# Patient Record
Sex: FEMALE | Race: WHITE | NOT HISPANIC OR LATINO | Employment: UNEMPLOYED | URBAN - METROPOLITAN AREA
[De-identification: names, ages, dates, MRNs, and addresses within clinical notes are randomized per-mention and may not be internally consistent; named-entity substitution may affect disease eponyms.]

---

## 2019-06-07 ENCOUNTER — TRANSCRIBE ORDERS (OUTPATIENT)
Dept: WOMENS IMAGING | Facility: HOSPITAL | Age: 29
End: 2019-06-07

## 2019-06-07 DIAGNOSIS — O26.91 PREGNANCY, COMPLICATIONS OF, FIRST TRIMESTER: ICD-10-CM

## 2019-06-07 DIAGNOSIS — O09.A1: Primary | ICD-10-CM

## 2019-06-11 ENCOUNTER — APPOINTMENT (OUTPATIENT)
Dept: WOMENS IMAGING | Facility: HOSPITAL | Age: 29
End: 2019-06-11

## 2019-06-26 ENCOUNTER — APPOINTMENT (OUTPATIENT)
Dept: WOMENS IMAGING | Facility: HOSPITAL | Age: 29
End: 2019-06-26

## 2019-11-06 ENCOUNTER — TRANSCRIBE ORDERS (OUTPATIENT)
Dept: WOMENS IMAGING | Facility: HOSPITAL | Age: 29
End: 2019-11-06

## 2019-11-06 DIAGNOSIS — B19.20 HEPATITIS C VIRUS INFECTION WITHOUT HEPATIC COMA, UNSPECIFIED CHRONICITY: ICD-10-CM

## 2019-11-06 DIAGNOSIS — F19.11 PERSONAL HISTORY OF DRUG ABUSE (HCC): ICD-10-CM

## 2019-11-06 DIAGNOSIS — O36.8390 ABNORMAL FETAL HEART RATE OR RHYTHM AFFECTING MANAGEMENT OF MOTHER: Primary | ICD-10-CM

## 2019-11-07 ENCOUNTER — HOSPITAL ENCOUNTER (OUTPATIENT)
Dept: WOMENS IMAGING | Facility: HOSPITAL | Age: 29
Discharge: HOME | End: 2019-11-07
Admitting: OBSTETRICS & GYNECOLOGY

## 2019-11-07 ENCOUNTER — OFFICE VISIT (OUTPATIENT)
Dept: OBSTETRICS AND GYNECOLOGY | Facility: HOSPITAL | Age: 29
End: 2019-11-07

## 2019-11-07 VITALS
HEIGHT: 67 IN | WEIGHT: 177 LBS | BODY MASS INDEX: 27.78 KG/M2 | SYSTOLIC BLOOD PRESSURE: 152 MMHG | DIASTOLIC BLOOD PRESSURE: 67 MMHG

## 2019-11-07 DIAGNOSIS — R03.0 ELEVATED BLOOD PRESSURE READING: ICD-10-CM

## 2019-11-07 DIAGNOSIS — B19.20 HEPATITIS C VIRUS INFECTION WITHOUT HEPATIC COMA, UNSPECIFIED CHRONICITY: ICD-10-CM

## 2019-11-07 DIAGNOSIS — O36.8390 ABNORMAL FETAL HEART RATE OR RHYTHM AFFECTING MANAGEMENT OF MOTHER: ICD-10-CM

## 2019-11-07 DIAGNOSIS — F11.20 OPIOID DEPENDENCE ON MAINTENANCE AGONIST THERAPY, NO SYMPTOMS (HCC): ICD-10-CM

## 2019-11-07 DIAGNOSIS — F19.11 PERSONAL HISTORY OF DRUG ABUSE (HCC): ICD-10-CM

## 2019-11-07 DIAGNOSIS — O34.219 PREVIOUS CESAREAN DELIVERY AFFECTING PREGNANCY: ICD-10-CM

## 2019-11-07 DIAGNOSIS — O36.8390 FETAL ARRHYTHMIA AFFECTING PREGNANCY, ANTEPARTUM: Primary | ICD-10-CM

## 2019-11-07 DIAGNOSIS — O36.8990 FETAL PERICARDIAL EFFUSION AFFECTING MANAGEMENT OF MOTHER: ICD-10-CM

## 2019-11-07 LAB
BILIRUB BLD-MCNC: NEGATIVE MG/DL
CLARITY, POC: CLEAR
COLOR UR: YELLOW
GLUCOSE UR STRIP-MCNC: ABNORMAL MG/DL
KETONES UR QL: NEGATIVE
LEUKOCYTE EST, POC: NEGATIVE
NITRITE UR-MCNC: NEGATIVE MG/ML
PH UR: 6 [PH] (ref 5–8)
PROT UR STRIP-MCNC: NEGATIVE MG/DL
RBC # UR STRIP: NEGATIVE /UL
SP GR UR: 1.01 (ref 1–1.03)
UROBILINOGEN UR QL: NORMAL

## 2019-11-07 PROCEDURE — 93325 DOPPLER ECHO COLOR FLOW MAPG: CPT | Performed by: OBSTETRICS & GYNECOLOGY

## 2019-11-07 PROCEDURE — 76825 ECHO EXAM OF FETAL HEART: CPT

## 2019-11-07 PROCEDURE — 76811 OB US DETAILED SNGL FETUS: CPT

## 2019-11-07 PROCEDURE — 93325 DOPPLER ECHO COLOR FLOW MAPG: CPT

## 2019-11-07 PROCEDURE — 76825 ECHO EXAM OF FETAL HEART: CPT | Performed by: OBSTETRICS & GYNECOLOGY

## 2019-11-07 PROCEDURE — 76811 OB US DETAILED SNGL FETUS: CPT | Performed by: OBSTETRICS & GYNECOLOGY

## 2019-11-07 PROCEDURE — 99241 PR OFFICE CONSULTATION NEW/ESTAB PATIENT 15 MIN: CPT | Performed by: OBSTETRICS & GYNECOLOGY

## 2019-11-07 PROCEDURE — 81002 URINALYSIS NONAUTO W/O SCOPE: CPT | Performed by: OBSTETRICS & GYNECOLOGY

## 2019-11-07 RX ORDER — PRENATAL WITH FERROUS FUM AND FOLIC ACID 3080; 920; 120; 400; 22; 1.84; 3; 20; 10; 1; 12; 200; 27; 25; 2 [IU]/1; [IU]/1; MG/1; [IU]/1; MG/1; MG/1; MG/1; MG/1; MG/1; MG/1; UG/1; MG/1; MG/1; MG/1; MG/1
TABLET ORAL DAILY
COMMUNITY

## 2019-11-07 RX ORDER — BUPRENORPHINE HYDROCHLORIDE 8 MG/1
16 TABLET SUBLINGUAL DAILY
Refills: 0 | COMMUNITY
Start: 2019-10-24

## 2019-11-07 RX ORDER — PROMETHAZINE HYDROCHLORIDE 12.5 MG/1
12.5 TABLET ORAL EVERY 6 HOURS PRN
Refills: 0 | COMMUNITY
Start: 2019-09-27

## 2019-11-07 NOTE — PROGRESS NOTES
"Pt denies all s/s PTL, denies other problems.  Pt reports \"4-5 caffienated sodas per day and coffee sometimes.\"  Denies cocoa butter use.  Pt denies all s/s preeclampsia.  Next OB appt 11/21/19.  Declined genetic screening tests.  "

## 2019-12-04 ENCOUNTER — APPOINTMENT (OUTPATIENT)
Dept: WOMENS IMAGING | Facility: HOSPITAL | Age: 29
End: 2019-12-04

## 2023-12-31 ENCOUNTER — HOSPITAL ENCOUNTER (INPATIENT)
Facility: HOSPITAL | Age: 33
LOS: 3 days | Discharge: NURSING, REHAB OR LONG-TERM CARE | DRG: 885 | End: 2024-01-03
Attending: PSYCHIATRY & NEUROLOGY | Admitting: PSYCHIATRY & NEUROLOGY
Payer: COMMERCIAL

## 2023-12-31 ENCOUNTER — HOSPITAL ENCOUNTER (EMERGENCY)
Facility: HOSPITAL | Age: 33
Discharge: PSYCHIATRIC OR SUBSTANCE USE FACILITY | DRG: 885 | End: 2023-12-31
Attending: STUDENT IN AN ORGANIZED HEALTH CARE EDUCATION/TRAINING PROGRAM | Admitting: STUDENT IN AN ORGANIZED HEALTH CARE EDUCATION/TRAINING PROGRAM
Payer: COMMERCIAL

## 2023-12-31 VITALS
BODY MASS INDEX: 24.48 KG/M2 | TEMPERATURE: 97.7 F | WEIGHT: 156 LBS | OXYGEN SATURATION: 97 % | SYSTOLIC BLOOD PRESSURE: 120 MMHG | DIASTOLIC BLOOD PRESSURE: 75 MMHG | HEIGHT: 67 IN | HEART RATE: 85 BPM | RESPIRATION RATE: 16 BRPM

## 2023-12-31 DIAGNOSIS — F39 MOOD DISORDER: Primary | ICD-10-CM

## 2023-12-31 PROBLEM — F29 PSYCHOSIS: Status: ACTIVE | Noted: 2023-12-31

## 2023-12-31 LAB
ALBUMIN SERPL-MCNC: 4.6 G/DL (ref 3.5–5.2)
ALBUMIN/GLOB SERPL: 1.4 G/DL
ALP SERPL-CCNC: 76 U/L (ref 39–117)
ALT SERPL W P-5'-P-CCNC: 20 U/L (ref 1–33)
AMPHET+METHAMPHET UR QL: NEGATIVE
AMPHETAMINES UR QL: NEGATIVE
ANION GAP SERPL CALCULATED.3IONS-SCNC: 15.6 MMOL/L (ref 5–15)
AST SERPL-CCNC: 15 U/L (ref 1–32)
B-HCG UR QL: NEGATIVE
BACTERIA UR QL AUTO: ABNORMAL /HPF
BARBITURATES UR QL SCN: NEGATIVE
BASOPHILS # BLD AUTO: 0.05 10*3/MM3 (ref 0–0.2)
BASOPHILS NFR BLD AUTO: 0.5 % (ref 0–1.5)
BENZODIAZ UR QL SCN: NEGATIVE
BILIRUB SERPL-MCNC: 0.3 MG/DL (ref 0–1.2)
BILIRUB UR QL STRIP: NEGATIVE
BUN SERPL-MCNC: 10 MG/DL (ref 6–20)
BUN/CREAT SERPL: 12 (ref 7–25)
BUPRENORPHINE SERPL-MCNC: NEGATIVE NG/ML
CALCIUM SPEC-SCNC: 10 MG/DL (ref 8.6–10.5)
CANNABINOIDS SERPL QL: NEGATIVE
CHLORIDE SERPL-SCNC: 103 MMOL/L (ref 98–107)
CLARITY UR: CLEAR
CO2 SERPL-SCNC: 22.4 MMOL/L (ref 22–29)
COCAINE UR QL: NEGATIVE
COLOR UR: YELLOW
CREAT SERPL-MCNC: 0.83 MG/DL (ref 0.57–1)
DEPRECATED RDW RBC AUTO: 46.5 FL (ref 37–54)
EGFRCR SERPLBLD CKD-EPI 2021: 94.4 ML/MIN/1.73
EOSINOPHIL # BLD AUTO: 0.04 10*3/MM3 (ref 0–0.4)
EOSINOPHIL NFR BLD AUTO: 0.4 % (ref 0.3–6.2)
ERYTHROCYTE [DISTWIDTH] IN BLOOD BY AUTOMATED COUNT: 16.3 % (ref 12.3–15.4)
ETHANOL BLD-MCNC: <10 MG/DL (ref 0–10)
ETHANOL UR QL: <0.01 %
FENTANYL UR-MCNC: NEGATIVE NG/ML
GLOBULIN UR ELPH-MCNC: 3.4 GM/DL
GLUCOSE SERPL-MCNC: 79 MG/DL (ref 65–99)
GLUCOSE UR STRIP-MCNC: NEGATIVE MG/DL
HCT VFR BLD AUTO: 38.7 % (ref 34–46.6)
HGB BLD-MCNC: 12 G/DL (ref 12–15.9)
HGB UR QL STRIP.AUTO: ABNORMAL
HOLD SPECIMEN: NORMAL
HOLD SPECIMEN: NORMAL
HYALINE CASTS UR QL AUTO: ABNORMAL /LPF
IMM GRANULOCYTES # BLD AUTO: 0.03 10*3/MM3 (ref 0–0.05)
IMM GRANULOCYTES NFR BLD AUTO: 0.3 % (ref 0–0.5)
KETONES UR QL STRIP: NEGATIVE
LEUKOCYTE ESTERASE UR QL STRIP.AUTO: ABNORMAL
LYMPHOCYTES # BLD AUTO: 2.67 10*3/MM3 (ref 0.7–3.1)
LYMPHOCYTES NFR BLD AUTO: 25 % (ref 19.6–45.3)
MAGNESIUM SERPL-MCNC: 2 MG/DL (ref 1.6–2.6)
MCH RBC QN AUTO: 24.4 PG (ref 26.6–33)
MCHC RBC AUTO-ENTMCNC: 31 G/DL (ref 31.5–35.7)
MCV RBC AUTO: 78.7 FL (ref 79–97)
METHADONE UR QL SCN: NEGATIVE
MONOCYTES # BLD AUTO: 0.68 10*3/MM3 (ref 0.1–0.9)
MONOCYTES NFR BLD AUTO: 6.4 % (ref 5–12)
NEUTROPHILS NFR BLD AUTO: 67.4 % (ref 42.7–76)
NEUTROPHILS NFR BLD AUTO: 7.23 10*3/MM3 (ref 1.7–7)
NITRITE UR QL STRIP: NEGATIVE
NRBC BLD AUTO-RTO: 0 /100 WBC (ref 0–0.2)
OPIATES UR QL: NEGATIVE
OXYCODONE UR QL SCN: NEGATIVE
PCP UR QL SCN: NEGATIVE
PH UR STRIP.AUTO: 6.5 [PH] (ref 5–8)
PLATELET # BLD AUTO: 230 10*3/MM3 (ref 140–450)
PMV BLD AUTO: 10.8 FL (ref 6–12)
POTASSIUM SERPL-SCNC: 3.9 MMOL/L (ref 3.5–5.2)
PROT SERPL-MCNC: 8 G/DL (ref 6–8.5)
PROT UR QL STRIP: NEGATIVE
RBC # BLD AUTO: 4.92 10*6/MM3 (ref 3.77–5.28)
RBC # UR STRIP: ABNORMAL /HPF
REF LAB TEST METHOD: ABNORMAL
SODIUM SERPL-SCNC: 141 MMOL/L (ref 136–145)
SP GR UR STRIP: 1.02 (ref 1–1.03)
SQUAMOUS #/AREA URNS HPF: ABNORMAL /HPF
TRICYCLICS UR QL SCN: NEGATIVE
UROBILINOGEN UR QL STRIP: ABNORMAL
WBC # UR STRIP: ABNORMAL /HPF
WBC NRBC COR # BLD AUTO: 10.7 10*3/MM3 (ref 3.4–10.8)
WHOLE BLOOD HOLD COAG: NORMAL
WHOLE BLOOD HOLD SPECIMEN: NORMAL

## 2023-12-31 PROCEDURE — 83735 ASSAY OF MAGNESIUM: CPT | Performed by: PHYSICIAN ASSISTANT

## 2023-12-31 PROCEDURE — 93010 ELECTROCARDIOGRAM REPORT: CPT | Performed by: INTERNAL MEDICINE

## 2023-12-31 PROCEDURE — 25010000002 LORAZEPAM PER 2 MG: Performed by: PSYCHIATRY & NEUROLOGY

## 2023-12-31 PROCEDURE — 80053 COMPREHEN METABOLIC PANEL: CPT | Performed by: PHYSICIAN ASSISTANT

## 2023-12-31 PROCEDURE — 36415 COLL VENOUS BLD VENIPUNCTURE: CPT

## 2023-12-31 PROCEDURE — 80307 DRUG TEST PRSMV CHEM ANLYZR: CPT | Performed by: PHYSICIAN ASSISTANT

## 2023-12-31 PROCEDURE — 81001 URINALYSIS AUTO W/SCOPE: CPT | Performed by: PHYSICIAN ASSISTANT

## 2023-12-31 PROCEDURE — 25010000002 HALOPERIDOL LACTATE PER 5 MG: Performed by: PSYCHIATRY & NEUROLOGY

## 2023-12-31 PROCEDURE — 85025 COMPLETE CBC W/AUTO DIFF WBC: CPT | Performed by: PHYSICIAN ASSISTANT

## 2023-12-31 PROCEDURE — 81025 URINE PREGNANCY TEST: CPT | Performed by: PHYSICIAN ASSISTANT

## 2023-12-31 PROCEDURE — 93005 ELECTROCARDIOGRAM TRACING: CPT | Performed by: PSYCHIATRY & NEUROLOGY

## 2023-12-31 PROCEDURE — 25010000002 DIPHENHYDRAMINE PER 50 MG: Performed by: PSYCHIATRY & NEUROLOGY

## 2023-12-31 PROCEDURE — 82077 ASSAY SPEC XCP UR&BREATH IA: CPT | Performed by: PHYSICIAN ASSISTANT

## 2023-12-31 PROCEDURE — 99285 EMERGENCY DEPT VISIT HI MDM: CPT

## 2023-12-31 PROCEDURE — 80074 ACUTE HEPATITIS PANEL: CPT | Performed by: PSYCHIATRY & NEUROLOGY

## 2023-12-31 RX ORDER — ACETAMINOPHEN 325 MG/1
650 TABLET ORAL EVERY 6 HOURS PRN
Status: DISCONTINUED | OUTPATIENT
Start: 2023-12-31 | End: 2024-01-03 | Stop reason: HOSPADM

## 2023-12-31 RX ORDER — BENZTROPINE MESYLATE 1 MG/ML
1 INJECTION INTRAMUSCULAR; INTRAVENOUS ONCE AS NEEDED
Status: DISCONTINUED | OUTPATIENT
Start: 2023-12-31 | End: 2024-01-03 | Stop reason: HOSPADM

## 2023-12-31 RX ORDER — LORAZEPAM 2 MG/ML
2 INJECTION INTRAMUSCULAR EVERY 6 HOURS PRN
Status: DISCONTINUED | OUTPATIENT
Start: 2023-12-31 | End: 2024-01-03 | Stop reason: HOSPADM

## 2023-12-31 RX ORDER — BENZTROPINE MESYLATE 1 MG/1
2 TABLET ORAL ONCE AS NEEDED
Status: DISCONTINUED | OUTPATIENT
Start: 2023-12-31 | End: 2024-01-03 | Stop reason: HOSPADM

## 2023-12-31 RX ORDER — DIPHENHYDRAMINE HYDROCHLORIDE 50 MG/ML
50 INJECTION INTRAMUSCULAR; INTRAVENOUS EVERY 6 HOURS PRN
Status: DISCONTINUED | OUTPATIENT
Start: 2023-12-31 | End: 2024-01-03 | Stop reason: HOSPADM

## 2023-12-31 RX ORDER — ONDANSETRON 4 MG/1
4 TABLET, FILM COATED ORAL EVERY 6 HOURS PRN
Status: DISCONTINUED | OUTPATIENT
Start: 2023-12-31 | End: 2024-01-03 | Stop reason: HOSPADM

## 2023-12-31 RX ORDER — IBUPROFEN 400 MG/1
400 TABLET ORAL EVERY 6 HOURS PRN
Status: DISCONTINUED | OUTPATIENT
Start: 2023-12-31 | End: 2024-01-03 | Stop reason: HOSPADM

## 2023-12-31 RX ORDER — ECHINACEA PURPUREA EXTRACT 125 MG
2 TABLET ORAL AS NEEDED
Status: DISCONTINUED | OUTPATIENT
Start: 2023-12-31 | End: 2024-01-03 | Stop reason: HOSPADM

## 2023-12-31 RX ORDER — FAMOTIDINE 20 MG/1
20 TABLET, FILM COATED ORAL 2 TIMES DAILY PRN
Status: DISCONTINUED | OUTPATIENT
Start: 2023-12-31 | End: 2024-01-03 | Stop reason: HOSPADM

## 2023-12-31 RX ORDER — BENZONATATE 100 MG/1
100 CAPSULE ORAL 3 TIMES DAILY PRN
Status: DISCONTINUED | OUTPATIENT
Start: 2023-12-31 | End: 2024-01-03 | Stop reason: HOSPADM

## 2023-12-31 RX ORDER — TRAZODONE HYDROCHLORIDE 50 MG/1
50 TABLET ORAL NIGHTLY PRN
Status: DISCONTINUED | OUTPATIENT
Start: 2023-12-31 | End: 2024-01-03 | Stop reason: HOSPADM

## 2023-12-31 RX ORDER — LOPERAMIDE HYDROCHLORIDE 2 MG/1
2 CAPSULE ORAL
Status: DISCONTINUED | OUTPATIENT
Start: 2023-12-31 | End: 2024-01-03 | Stop reason: HOSPADM

## 2023-12-31 RX ORDER — HYDROXYZINE 50 MG/1
50 TABLET, FILM COATED ORAL EVERY 6 HOURS PRN
Status: DISCONTINUED | OUTPATIENT
Start: 2023-12-31 | End: 2024-01-03 | Stop reason: HOSPADM

## 2023-12-31 RX ORDER — HALOPERIDOL 5 MG/ML
5 INJECTION INTRAMUSCULAR EVERY 6 HOURS PRN
Status: DISCONTINUED | OUTPATIENT
Start: 2023-12-31 | End: 2024-01-03 | Stop reason: HOSPADM

## 2023-12-31 RX ORDER — NICOTINE 21 MG/24HR
1 PATCH, TRANSDERMAL 24 HOURS TRANSDERMAL
Status: DISCONTINUED | OUTPATIENT
Start: 2023-12-31 | End: 2024-01-02

## 2023-12-31 RX ORDER — ALUMINA, MAGNESIA, AND SIMETHICONE 2400; 2400; 240 MG/30ML; MG/30ML; MG/30ML
15 SUSPENSION ORAL EVERY 6 HOURS PRN
Status: DISCONTINUED | OUTPATIENT
Start: 2023-12-31 | End: 2024-01-03 | Stop reason: HOSPADM

## 2023-12-31 RX ADMIN — LORAZEPAM 2 MG: 2 INJECTION INTRAMUSCULAR; INTRAVENOUS at 19:20

## 2023-12-31 RX ADMIN — HALOPERIDOL LACTATE 5 MG: 5 INJECTION, SOLUTION INTRAMUSCULAR at 19:20

## 2023-12-31 RX ADMIN — NICOTINE TRANSDERMAL SYSTEM 1 PATCH: 21 PATCH, EXTENDED RELEASE TRANSDERMAL at 16:12

## 2023-12-31 RX ADMIN — DIPHENHYDRAMINE HYDROCHLORIDE 50 MG: 50 INJECTION, SOLUTION INTRAMUSCULAR; INTRAVENOUS at 19:20

## 2023-12-31 NOTE — ED PROVIDER NOTES
"Subjective   History of Present Illness  33-year-old female who [facility]  When I asked her why she is here she states \"I think it is because my blood pressure was high.\"  She states she went to that facility last night.  I asked her what kind of drugs she had been using and she stated \"what ever the [facility] told me I had.\"  She states she does use \"ice\" and reports that she came off Suboxone on her own.  She states she occasionally drinks alcohol.  She denies any suicidal or homicidal ideations.  She denies any change in her appetite or sleep.  She denies any hallucinations.    History provided by:  Patient  Mental Health Problem  Presenting symptoms: bizarre behavior    Presenting symptoms: no suicidal thoughts    Degree of incapacity (severity):  Unable to specify  Onset quality:  Unable to specify  Timing:  Unable to specify  Progression:  Unable to specify  Context: drug abuse    Associated symptoms: no appetite change and no insomnia    Risk factors: hx of mental illness        Review of Systems   Constitutional: Negative.  Negative for appetite change.   HENT: Negative.     Eyes: Negative.    Respiratory: Negative.     Cardiovascular: Negative.    Gastrointestinal: Negative.    Genitourinary: Negative.    Musculoskeletal: Negative.    Skin: Negative.    Neurological: Negative.    Psychiatric/Behavioral:  Negative for sleep disturbance and suicidal ideas. The patient does not have insomnia.    All other systems reviewed and are negative.      Past Medical History:   Diagnosis Date    Bipolar affective     Depression     Hepatitis C     Kidney stones     Substance abuse        No Known Allergies    Past Surgical History:   Procedure Laterality Date     SECTION PRIMARY      CHOLECYSTECTOMY      CYSTOSCOPY W/ URETEROSCOPY W/ LITHOTRIPSY      SALPINGO OOPHORECTOMY Left        Family History   Family history unknown: Yes       Social History     Socioeconomic History    " Marital status:      Spouse name:     Number of children: 5    Years of education: 10th    Highest education level: 10th grade   Tobacco Use    Smoking status: Some Days     Packs/day: .5     Types: Cigarettes     Passive exposure: Current    Smokeless tobacco: Current    Tobacco comments:     Vape    Vaping Use    Vaping Use: Every day    Substances: Nicotine, Flavoring    Devices: Disposable, Pre-filled or refillable cartridge, Pre-filled pod   Substance and Sexual Activity    Alcohol use: Not Currently     Comment: not currently    Drug use: Not Currently     Comment: suboxone    Sexual activity: Defer     Birth control/protection: None           Objective   Physical Exam  Vitals and nursing note reviewed.   Constitutional:       General: She is not in acute distress.     Appearance: Normal appearance. She is not diaphoretic.   HENT:      Head: Normocephalic and atraumatic.      Right Ear: External ear normal.      Left Ear: External ear normal.      Nose: Nose normal.   Eyes:      Conjunctiva/sclera: Conjunctivae normal.      Pupils: Pupils are equal, round, and reactive to light.   Cardiovascular:      Rate and Rhythm: Normal rate and regular rhythm.      Pulses: Normal pulses.      Heart sounds: Normal heart sounds.   Pulmonary:      Effort: Pulmonary effort is normal.      Breath sounds: Normal breath sounds.   Abdominal:      General: Bowel sounds are normal.      Palpations: Abdomen is soft.   Musculoskeletal:         General: Normal range of motion.      Cervical back: Normal range of motion and neck supple.   Skin:     General: Skin is warm and dry.      Capillary Refill: Capillary refill takes less than 2 seconds.   Neurological:      General: No focal deficit present.      Mental Status: She is alert and oriented to person, place, and time.   Psychiatric:         Behavior: Behavior is cooperative.         Thought Content: Thought content does not include homicidal or suicidal ideation.       Comments: Laughing at inappropriate times         Procedures           ED Course  ED Course as of 12/31/23 1528   Sun Dec 31, 2023   1400 Endorsed to Dr. Alfaro []      ED Course User Index  [] Ida Santoyo PA                                             Medical Decision Making  Patient was cleared for behavioral health evaluation.    Patient will be admitted to behavioral health for further treatment and care.  Patient was agreeable to plan.  Vitals stable on admission to behavioral [facility].    Amount and/or Complexity of Data Reviewed  Labs: ordered.        Final diagnoses:   Mood disorder   Electronically signed by SILVINO Block, 12/31/23, 2:00 PM EST.     ED Disposition  ED Disposition       ED Disposition   DC/Transfer to Behavioral Health    Condition   Stable    Comment   --               No follow-up provider specified.       Medication List      No changes were made to your prescriptions during this visit.            Steven Alfaro,   12/31/23 1528

## 2023-12-31 NOTE — NURSING NOTE
"Pt assessment complete    [facility]/2 days. Pt states before this she was in retirement for 15 days for public intoxication.     Pt denies si/hi/avh   Pt does appear to be responding to internal stimuli. She is giggling to herself.   Depression 0  Anxiety 7  Pt reports coming here for \"high bp\"   Pt is oriented to self, and time, and was able to tell me who the president is.    Pt does have a response lag and is a poor historian but is able to answer most questions appropriately.     [facility] staff stated that in there evaluation today the pt had reported SI with no plan or intent.   Pt reported good sleep and appetite   Pt was unable to tell me if she had been using substances but reported it had been \"awhile\" since last drinking ETOH.  Pt does not appear to be in any withdrawal at this time.   UDS -     "

## 2023-12-31 NOTE — NURSING NOTE
Presented pt to DR. ANGELO Crocker new order to admit sp3 routine orders. Pt can be placed on a 72 hour hold if pt is not agreeable. RBOTX2

## 2023-12-31 NOTE — PLAN OF CARE
Goal Outcome Evaluation:     Patient Agreement with Plan of Care: refuses to participate             New admission.  Care plan initiated.

## 2024-01-01 PROBLEM — F19.90 POLYSUBSTANCE USE DISORDER: Status: ACTIVE | Noted: 2024-01-01

## 2024-01-01 PROBLEM — F31.9 BIPOLAR DISORDER, UNSPECIFIED: Status: ACTIVE | Noted: 2023-12-31

## 2024-01-01 PROBLEM — F43.10 PTSD (POST-TRAUMATIC STRESS DISORDER): Status: ACTIVE | Noted: 2024-01-01

## 2024-01-01 LAB
HAV IGM SERPL QL IA: ABNORMAL
HBV CORE IGM SERPL QL IA: ABNORMAL
HBV SURFACE AG SERPL QL IA: ABNORMAL
HCV AB SER DONR QL: REACTIVE
QT INTERVAL: 362 MS
QTC INTERVAL: 417 MS

## 2024-01-01 PROCEDURE — 99223 1ST HOSP IP/OBS HIGH 75: CPT | Performed by: PSYCHIATRY & NEUROLOGY

## 2024-01-01 RX ORDER — PRAZOSIN HYDROCHLORIDE 1 MG/1
1 CAPSULE ORAL NIGHTLY
Status: DISCONTINUED | OUTPATIENT
Start: 2024-01-01 | End: 2024-01-03 | Stop reason: HOSPADM

## 2024-01-01 RX ORDER — RISPERIDONE 0.25 MG/1
0.5 TABLET ORAL EVERY 12 HOURS SCHEDULED
Status: DISCONTINUED | OUTPATIENT
Start: 2024-01-01 | End: 2024-01-03 | Stop reason: HOSPADM

## 2024-01-01 RX ADMIN — NICOTINE TRANSDERMAL SYSTEM 1 PATCH: 21 PATCH, EXTENDED RELEASE TRANSDERMAL at 09:38

## 2024-01-01 RX ADMIN — HYDROXYZINE HYDROCHLORIDE 50 MG: 50 TABLET ORAL at 20:31

## 2024-01-01 RX ADMIN — RISPERIDONE 0.5 MG: 0.25 TABLET, FILM COATED ORAL at 12:52

## 2024-01-01 RX ADMIN — PRAZOSIN HYDROCHLORIDE 1 MG: 1 CAPSULE ORAL at 20:31

## 2024-01-01 NOTE — PLAN OF CARE
Goal Outcome Evaluation:  Plan of Care Reviewed With: patient  Patient Agreement with Plan of Care: refuses to participate     Progress: no change      Outcome Evaluation: Unable to assess patient related to behaviors and PRN medications. See notes
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
## 2024-01-01 NOTE — DISCHARGE INSTR - APPOINTMENTS
[facility] ([place], [place])  [address] [address] [address]    Return at discharge.

## 2024-01-01 NOTE — PROGRESS NOTES
[employer] by [facility]   Behavioral Health Service Request Form     Member Information    Line of Business: Medicaid  Date of Request: 24  State[place]  Member Name: AUSTEN 1990  Member Phone:[phone]  Service Type: Emergent Inpatient Admission     Referral/Service Type Requested     Request Type: Initial Request  Previous Auth#:   Inpatient Services: Inpatient Psychiatric   Outpatient Services:     Please Send Clinical Notes And Any Supporting Documentation     Primary ICD-10 Code for Treatment: Description: PSYCHOSIS     Dates of Service:2023  Procedure:  Service Codes:  Diagnosis Code:F 29  Requested Service: INPATIENT PSYH  Requested Units/Visits:5-7 days    Provider Information  Requesting Provider / Facility:     NPI#: [id number]  TIN#: [id number]  Phone: [phone]  Fax:[phone]    Address: [address] [place] [address]: [place]  State: [place]  Zip: [address]  PCP Name: Unknown  PCP Phone: unknown  Office Contact Name: Kathi Douglas RN  Office Contact Phone: [phone]    Servicing Provider / Facility:   Provider/Facility Name (Required):  DR NOHEMI LAWRENCE  NPI#: [id number]  TIN#: [id number]    Phone: [phone]  Fax: [phone]  Address: [address] [place] [address]: [place]  State: [place]  Zip: [address]      Patient presents to the nurses station yelling and demanding to leave. Attempted to redirect the patient multiple times without success. Redirected patient to room patient continues to yell hitting the walls.     Lead RN and security present for safety. Dr Shah notified.      New order for Haldol 5 mg IM q 6hrs prn Benadryl 50 mg Im q6hrs prn. Ativan 2 mg Im q6hrs for agitation ;  72 hr hold; Restraints if needed. RBVX2.            In attempt to enter PRN medication- epic will not allow to enter Ativan IM; Spoke with Pharmacy- Andrez Advised must enter a pharmacy message and will be entered by pharmacy related to ativan shortage.

## 2024-01-01 NOTE — NURSING NOTE
Debriefing completed at this time. Pt still cursing staff and demanding to leave. Unable to obtain vitals signs due to patient being verbally abusive with staff.

## 2024-01-01 NOTE — NURSING NOTE
"Resp. Tech present to complete EKG- Patient raised shirt for placement of sticker.  This nurse noted large bruise on left upper arm. When asking patient about bruise- pt repeats \" NO NO NO\"  Pt refused full skin assessment-  Educate patient at this time- importance of full head to assessment .  Pt continue to refused.         "

## 2024-01-01 NOTE — NURSING NOTE
Spoke to Dr. Shah received order for private room until assessed in the morning by oncoming MD. [id number].

## 2024-01-01 NOTE — NURSING NOTE
"ONE HR FACE TO FACE COMPLETE.  PT SITTING IN BED CONTINUES TO CURSE SECURITY AND STAFF DEMANDING TO LEAVE THIS \"FUCKING PLACE\".  PT REFUSED VITAL SIGNS, RESPIRATIONS EVEN AND UNLABORED, NO INJURY TO PT OR STAFF.  BRIEF HOLD DISCONTINUED.  DR GUTHRIE NOTIFIED BY HAMMAD ROGERS.  "

## 2024-01-02 VITALS
BODY MASS INDEX: 23.89 KG/M2 | RESPIRATION RATE: 18 BRPM | SYSTOLIC BLOOD PRESSURE: 132 MMHG | DIASTOLIC BLOOD PRESSURE: 72 MMHG | OXYGEN SATURATION: 100 % | TEMPERATURE: 96.8 F | WEIGHT: 152.2 LBS | HEART RATE: 109 BPM | HEIGHT: 67 IN

## 2024-01-02 LAB — TROPONIN T SERPL HS-MCNC: <6 NG/L

## 2024-01-02 PROCEDURE — 93005 ELECTROCARDIOGRAM TRACING: CPT | Performed by: PSYCHIATRY & NEUROLOGY

## 2024-01-02 PROCEDURE — 84484 ASSAY OF TROPONIN QUANT: CPT | Performed by: PSYCHIATRY & NEUROLOGY

## 2024-01-02 PROCEDURE — 93010 ELECTROCARDIOGRAM REPORT: CPT | Performed by: INTERNAL MEDICINE

## 2024-01-02 PROCEDURE — 99232 SBSQ HOSP IP/OBS MODERATE 35: CPT | Performed by: PSYCHIATRY & NEUROLOGY

## 2024-01-02 RX ADMIN — RISPERIDONE 0.5 MG: 0.25 TABLET, FILM COATED ORAL at 08:25

## 2024-01-02 RX ADMIN — NICOTINE POLACRILEX 2 MG: 2 GUM, CHEWING BUCCAL at 14:12

## 2024-01-02 RX ADMIN — NICOTINE POLACRILEX 2 MG: 2 GUM, CHEWING BUCCAL at 16:01

## 2024-01-02 RX ADMIN — HYDROXYZINE HYDROCHLORIDE 50 MG: 50 TABLET ORAL at 16:01

## 2024-01-02 RX ADMIN — RISPERIDONE 0.5 MG: 0.25 TABLET, FILM COATED ORAL at 21:17

## 2024-01-02 RX ADMIN — NICOTINE TRANSDERMAL SYSTEM 1 PATCH: 21 PATCH, EXTENDED RELEASE TRANSDERMAL at 08:25

## 2024-01-02 RX ADMIN — HYDROXYZINE HYDROCHLORIDE 50 MG: 50 TABLET ORAL at 08:25

## 2024-01-02 RX ADMIN — PRAZOSIN HYDROCHLORIDE 1 MG: 1 CAPSULE ORAL at 21:17

## 2024-01-02 NOTE — PLAN OF CARE
"Goal Outcome Evaluation:  Plan of Care Reviewed With: patient  Patient Agreement with Plan of Care: agrees     Progress: improving  Outcome Evaluation: Patient has been calm, cooperative and polite during this shift. She spends free time in the day room this evening watching TV and being interactive with peers. She apologized to staff for her behaviors last night and expressed to staff that she is greatful for the snacks that we provided her this evening. Patient laid down early this evening around 9pm . Patient reports sleep and appetite were good. She rates anxiety 10 and depression 5 and reports that she has been in a \"stable\" mood this evening. Patient denies any other complaints at this time.         "

## 2024-01-02 NOTE — PLAN OF CARE
Goal Outcome Evaluation:        Problem: Adult Behavioral Health Plan of Care  Goal: Patient-Specific Goal (Individualization)  Outcome: Ongoing, Progressing  Flowsheets  Taken 1/1/2024 1335 by Yvrose Joyce CSW  Patient-Specific Goals (Include Timeframe): Identify 2-3 coping skills, address relapse prevention methods, complete aftercare plans, and deny SI/HI prior to discharge.  Individualized Care Needs: Therapist to offer 1-4 therapy sessions, aftercare planning, safety planning, family education, group therapy, and breif CBT/MI interventions.  Taken 1/1/2024 1326 by Yvrose Joyce CSW  Patient Personal Strengths:   resourceful   resilient   parenting skills   spiritual/Anabaptist support   stable living environment   motivated for recovery   motivated for treatment   independent living skills  Patient Vulnerabilities:   occupational insecurity   poor impulse control   history of unsuccessful treatment   substance abuse/addiction   adverse childhood experience(s)   traumatic event  Taken 12/31/2023 1708 by Rajani Dougherty RN  Anxieties, Fears or Concerns: None verbalized  Goal: Optimized Coping Skills in Response to Life Stressors  Outcome: Ongoing, Progressing  Flowsheets (Taken 1/1/2024 1335)  Optimized Coping Skills in Response to Life Stressors: making progress toward outcome  Intervention: Promote Effective Coping Strategies  Flowsheets (Taken 1/2/2024 1124)  Supportive Measures:   active listening utilized   counseling provided   decision-making supported   goal-setting facilitated   positive reinforcement provided   verbalization of feelings encouraged   relaxation techniques promoted   self-care encouraged   self-reflection promoted   self-responsibility promoted  Goal: Develops/Participates in Therapeutic [place] to Support Successful Transition  Outcome: Ongoing, Progressing  Flowsheets (Taken 1/1/2024 1335)  Develops/Participates in Therapeutic [place] to Support Successful Transition:  making progress toward outcome  Intervention: Foster Therapeutic [place]  Flowsheets (Taken 1/2/2024 1124)  Trust Relationship/Rapport:   care explained   questions encouraged   choices provided   reassurance provided   emotional support provided   thoughts/feelings acknowledged   empathic listening provided   questions answered  Intervention: Mutually Develop Transition Plan  Flowsheets  Taken 1/2/2024 1123  Discharge Coordination/Progress: Patient has Passport Medicaid. Patient to return to [facility] at discharge, agency to transport.  Transportation Anticipated: agency  Transportation Concerns: none  Current Discharge Risk:   psychiatric illness   substance use/abuse   legal concerns  Concerns to be Addressed:   substance/tobacco abuse/use   cognitive/perceptual   coping/stress   medication   mental health   discharge planning  Readmission Within the Last 30 Days: no previous admission in last 30 days  Patient/Family Anticipated Services at Transition: rehabilitation services  Patient's Choice of Community Agency(s): [facility]  Patient/Family Anticipates Transition to: inpatient rehabilitation facility  Offered/Gave Vendor List: yes  Taken 1/1/2024 5825  Outpatient/Agency/Support Group Needs:   outpatient psychiatric care (specify)   outpatient medication management   outpatient substance abuse treatment (specify)   residential services  Transition Support:   follow-up care discussed   community resources reviewed   crisis management plan promoted   crisis management plan verbalized   follow-up care coordinated  Anticipated Discharge Disposition: (to be determined) other (see comments)         DATA:  Therapist met with Patient individually this date. Patient agreeable to discuss current treatment progress and discharge concerns.     CLINICAL MANUVERING/INTERVENTIONS:  Assisted Patient in processing session content; acknowledged and normalized Patient’s thoughts, feelings, and concerns by utilizing a person-centered approach  in efforts to build appropriate rapport and a positive therapeutic relationship with open and honest communication. Allowed Patient to ventilate regarding current stressors and triggers for negative emotions and thoughts in a safe nonjudgmental environment with unconditional positive regard, active listening skills, and empathy. Therapist implemented motivational interviewing techniques to assist Patient with exploring personal growth and change and discussed distress tolerance skills, self soothing techniques, and applied cognitive behavioral strategies to facilitate identification of maladaptive patterns of thinking and behavior.Therapist utilized dialectical behavior techniques to teach and model emotional regulation and relaxation methods. Therapist assisted Patient with identifying and implementing healthier coping strategies. Therapist assisted Patient with safety planning; Patient agreed to continue honest communication with Treatment Team while inpatient and identify any SI/HI.  Patient encouraged to seek nearest ER or contact 911 if danger to self or others post discharge.     ASSESSMENT:    Patient continues to receive treatment for bipolar disorder. Patient reports moderate anxiety and depression, denies current SI/HI/AVH. Patient denies experiencing any active withdrawal symptoms. Patient presents from [facility] and plans to return there when stable. Agency to provide transportation.    PLAN:   Patient will continue stabilization. Patient will continue to receive services offered by Treatment Team.     Patient plans to return to [facility], updated referral has been sent.
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
## 2024-01-02 NOTE — PROGRESS NOTES
"INPATIENT PSYCHIATRIC PROGRESS NOTE    Name:  Gordon Otero  :  1990  MRN:  [id number]  Visit Number:  [id number]  Length of stay:  2    SUBJECTIVE    CC/Focus of Exam: Bipolar disorder    INTERVAL HISTORY:  The patient reports she is feeling better. She states she was able to sleep good last night and her mood is good. Reports no thoughts of harm to self or others. Also reports no AVH.   Depression rating 5/10  Anxiety rating 5/10  Sleep: good  Withdrawal sx: None reported.   Cravin/10    Review of Systems   Constitutional: Negative.    Respiratory: Negative.     Cardiovascular: Negative.    Gastrointestinal: Negative.        OBJECTIVE    Temp:  [96.9 °F (36.1 °C)-97.8 °F (36.6 °C)] 97.2 °F (36.2 °C)  Heart Rate:  [86-96] 86  Resp:  [18] 18  BP: (110-161)/(56-74) 110/59    MENTAL STATUS EXAM:  Appearance:Casually dressed, good hygeine.   Cooperation:Cooperative  Psychomotor: No psychomotor agitation/retardation, No EPS, No motor tics  Speech-normal rate, amount.  Mood \"better\"   Affect-congruent, appropriate, stable  Thought Content-goal directed, no delusional material present  Thought process-linear, organized.  Suicidality: No SI  Homicidality: No HI  Perception: No AH/VH  Insight-fair   Judgement-fair    Lab Results (last 24 hours)       ** No results found for the last 24 hours. **               Imaging Results (Last 24 Hours)       ** No results found for the last 24 hours. **               ECG/EMG Results (most recent)       Procedure Component Value Units Date/Time    ECG 12 Lead Other; Baseline Cardiac Status [[id number]] Collected: 23 1711     Updated: 24 1848     QT Interval 362 ms      QTC Interval 417 ms     Narrative:      Test Reason : Other~  Blood Pressure :   */*   mmHG  Vent. Rate :  80 BPM     Atrial Rate :  80 BPM     P-R Int : 188 ms          QRS Dur :  76 ms      QT Int : 362 ms       P-R-T Axes :  70  60  61 degrees     QTc Int : 417 ms    Normal sinus rhythm  Possible " Left atrial enlargement  Borderline ECG  No previous ECGs available  Confirmed by Fifi De La Vega MD (2023) on 1/1/2024 6:47:13 PM    Referred By: HOWARD           Confirmed By: Fifi De La Vega MD             ALLERGIES: Patient has no known allergies.    Medication Review:   Scheduled Medications:  nicotine, 1 patch, Transdermal, Q24H  prazosin, 1 mg, Oral, Nightly  risperiDONE, 0.5 mg, Oral, Q12H         PRN Medications:    acetaminophen    aluminum-magnesium hydroxide-simethicone    benzonatate    benztropine **OR** benztropine    haloperidol lactate **AND** diphenhydrAMINE    famotidine    hydrOXYzine    ibuprofen    loperamide    LORazepam    magnesium hydroxide    ondansetron    sodium chloride    traZODone   All medications reviewed.    ASSESSMENT & PLAN:      Bipolar disorder, unspecified  -Started risperidone 0.5 mg bid       PTSD (post-traumatic stress disorder)  -Risperidone as above  -Prazosin 1 mg hs       Polysubstance use disorder  -Patient is vague about her use. Monitor for withdrawals.     Special precautions: Special Precautions Level 3 (q15 min checks) .    Behavioral Health Treatment Plan and Problem List: I have reviewed and approved the Behavioral Health Treatment Plan and Problem list.  The patient has had a chance to review and agrees with the treatment plan.    Copied text in portions of this note has been reviewed and is accurate as of 01/02/24         Clinician:  Joy Smith MD  01/02/24  13:00 EST

## 2024-01-02 NOTE — PROGRESS NOTES
Navigator is helping with the following referral:    [facility] - [phone]  -Sent 1/2  -Spoke with [facility]. They have approved patient to re-admit. [facility] to call back with bed and  time.  1/2

## 2024-01-02 NOTE — PLAN OF CARE
Goal Outcome Evaluation:  Plan of Care Reviewed With: patient  Patient Agreement with Plan of Care: agrees     Progress: improving  Outcome Evaluation: Pt rates Anx 4 dep 0 Denies SI/HI/AVH with a good appetite and sleeping routine states her mood is stable calm and cooperative

## 2024-01-03 LAB
QT INTERVAL: 330 MS
QTC INTERVAL: 414 MS

## 2024-01-03 PROCEDURE — 99238 HOSP IP/OBS DSCHRG MGMT 30/<: CPT | Performed by: PSYCHIATRY & NEUROLOGY

## 2024-01-03 RX ORDER — RISPERIDONE 0.5 MG/1
0.5 TABLET ORAL EVERY 12 HOURS SCHEDULED
Qty: 60 TABLET | Refills: 0 | Status: SHIPPED | OUTPATIENT
Start: 2024-01-03

## 2024-01-03 RX ORDER — PRAZOSIN HYDROCHLORIDE 1 MG/1
1 CAPSULE ORAL NIGHTLY
Qty: 30 CAPSULE | Refills: 0 | Status: SHIPPED | OUTPATIENT
Start: 2024-01-03

## 2024-01-03 RX ADMIN — RISPERIDONE 0.5 MG: 0.25 TABLET, FILM COATED ORAL at 08:33

## 2024-01-03 RX ADMIN — HYDROXYZINE HYDROCHLORIDE 50 MG: 50 TABLET ORAL at 08:33

## 2024-01-03 RX ADMIN — NICOTINE POLACRILEX 2 MG: 2 GUM, CHEWING BUCCAL at 08:34

## 2024-01-03 NOTE — NURSING NOTE
"Pt was sitting in the day room and had c/o chest pain. Pt stated it was in the epigastric and left upper back, pt described the pain as \"sharp\". STAT EKG and Troponin ordered. Dr. Smith notified  "

## 2024-01-03 NOTE — PROGRESS NOTES
Navigator is helping with the following referral:     [facility] - [phone]  -Sent 1/2  -Spoke with Marianna. They have approved patient to re-admit. Marianna to call back with bed and  time.  1/2  -Patient approved. Bed today at [facility]. Marianna to call back with  time.   1/3

## 2024-01-03 NOTE — CASE MANAGEMENT/SOCIAL WORK
Patient Name:  Gordon Otero  YOB: 1990  MRN: [id number]  Admit Date:  12/31/2023    Patient is being discharged back to [facility] on this date. Agency scheduled to provide transportation around 13:00. Patient reports improvement in mood, denies SI/HI/AVH. Healthy coping skills and relapse prevention have been reviewed. Patient has been assisted with identifying risk factors which would indicate the need for higher level of care including thoughts to harm self or others and/or self-harming behavior. Encouraged patient to notify facility staff, call [phone] or present to the nearest emergency room should any of these events occur. Patient adamantly and convincingly denies suicidal and homicidal ideation or perceptual disturbance at time of discharge. No other needs identified.     Electronically signed by:  JOE Casas  01/03/24 11:33 EST

## 2024-01-03 NOTE — PLAN OF CARE
"Goal Outcome Evaluation:  Plan of Care Reviewed With: patient  Patient Agreement with Plan of Care: agrees     Progress: improving  Outcome Evaluation: Pt rates anxiety 10/10 and depression 4/10. Pt denies SI/HI/AVH. Pt reports eating and sleeping well. Pt became upset when she could not bring a blanket into the day room and began yelling that this was \"fucking ridiculous\". Pt then began to yell that she was \"having a heart attack\" and reporting chest pain. Chest pain protocol completed. Pt has been sleeping well and eating well.         "

## 2024-01-03 NOTE — DISCHARGE SUMMARY
":  1990  MRN:  [id number]  Visit Number:  [id number]      Date of Admission:2023   Date of Discharge:  1/3/2024    Discharge Diagnosis:  Principal Problem:    Bipolar disorder, unspecified  Active Problems:    Polysubstance use disorder    PTSD (post-traumatic stress disorder)        Admission Diagnosis:  Psychosis [F29]     HPI  Gordon Otero is a 33 y.o. female who was admitted on 2023 with complaints of SI and bizarre behaviors and was reportedly responding to internal stimuli.    For details please see H&P dated 24.     Hospital Course  Patient is a 33 y.o. female presented with psychosis and bizarre behaviors. The patient was admitted to the [facility] adult psych unit for safety, further evaluation and treatment.  The patient presented with symptoms of mixed phillip and she also reported a history of polysubstance use including alcohol, methamphetamine, thc, benzo and opioids.   The patient was started on risperidone for her bipolar disorder. She was monitored for any withdrawals but she didn't exhibit any.   The patient was also able to take part in individual and group counseling sessions and work on appropriate coping skills.  The patient made steady improvement in her mood and expressed feeling more positive and hopeful about future. Sleep and appetite were improved.  The day of discharge the patient was calm, cooperative and pleasant. Mood was reported to be good, and denied SI/HI/AVH. Also reported no medication side effects.  .      Mental Status Exam upon discharge:   Mood \"good\"   Affect-congruent, appropriate, stable  Thought Content-goal directed, no delusional material present  Thought process-linear, organized.  Suicidality: No SI  Homicidality: No HI  Perception: No AH/VH    Procedures Performed         Consults:   Consults       No orders found from 2023 to 2024.            Pertinent Test Results:   Admission on 2023   Component Date Value Ref Range " Status    Hepatitis B Surface Ag 12/31/2023 Non-Reactive  Non-Reactive Final    Hep A IgM 12/31/2023 Non-Reactive  Non-Reactive Final    Hep B C IgM 12/31/2023 Non-Reactive  Non-Reactive Final    Hepatitis C Ab 12/31/2023 Reactive (A)  Non-Reactive Final    QT Interval 12/31/2023 362  ms Final    QTC Interval 12/31/2023 417  ms Final    QT Interval 01/02/2024 330  ms Final    QTC Interval 01/02/2024 414  ms Final    HS Troponin T 01/02/2024 <6  <14 ng/L Final   Admission on 12/31/2023, Discharged on 12/31/2023   Component Date Value Ref Range Status    Glucose 12/31/2023 79  65 - 99 mg/dL Final    BUN 12/31/2023 10  6 - 20 mg/dL Final    Creatinine 12/31/2023 0.83  0.57 - 1.00 mg/dL Final    Sodium 12/31/2023 141  136 - 145 mmol/L Final    Potassium 12/31/2023 3.9  3.5 - 5.2 mmol/L Final    Chloride 12/31/2023 103  98 - 107 mmol/L Final    CO2 12/31/2023 22.4  22.0 - 29.0 mmol/L Final    Calcium 12/31/2023 10.0  8.6 - 10.5 mg/dL Final    Total Protein 12/31/2023 8.0  6.0 - 8.5 g/dL Final    Albumin 12/31/2023 4.6  3.5 - 5.2 g/dL Final    ALT (SGPT) 12/31/2023 20  1 - 33 U/L Final    AST (SGOT) 12/31/2023 15  1 - 32 U/L Final    Alkaline Phosphatase 12/31/2023 76  39 - 117 U/L Final    Total Bilirubin 12/31/2023 0.3  0.0 - 1.2 mg/dL Final    Globulin 12/31/2023 3.4  gm/dL Final    A/G Ratio 12/31/2023 1.4  g/dL Final    BUN/Creatinine Ratio 12/31/2023 12.0  7.0 - 25.0 Final    Anion Gap 12/31/2023 15.6 (H)  5.0 - 15.0 mmol/L Final    eGFR 12/31/2023 94.4  >60.0 mL/min/1.73 Final    HCG, Urine QL 12/31/2023 Negative  Negative Final    Color, UA 12/31/2023 Yellow  Yellow, Straw Final    Appearance, UA 12/31/2023 Clear  Clear Final    pH, UA 12/31/2023 6.5  5.0 - 8.0 Final    Specific [place], UA 12/31/2023 1.019  1.005 - 1.030 Final    Glucose, UA 12/31/2023 Negative  Negative Final    Ketones, UA 12/31/2023 Negative  Negative Final    Bilirubin, UA 12/31/2023 Negative  Negative Final    Blood, UA 12/31/2023 Small (1+)  (A)  Negative Final    Protein, UA 12/31/2023 Negative  Negative Final    Leuk Esterase, UA 12/31/2023 Small (1+) (A)  Negative Final    Nitrite, UA 12/31/2023 Negative  Negative Final    Urobilinogen, UA 12/31/2023 0.2 E.U./dL  0.2 - 1.0 E.U./dL Final    Ethanol 12/31/2023 <10  0 - 10 mg/dL Final    Ethanol % 12/31/2023 <0.010  % Final    THC, Screen, Urine 12/31/2023 Negative  Negative Final    Phencyclidine (PCP), Urine 12/31/2023 Negative  Negative Final    Cocaine Screen, Urine 12/31/2023 Negative  Negative Final    Methamphetamine, Ur 12/31/2023 Negative  Negative Final    Opiate Screen 12/31/2023 Negative  Negative Final    Amphetamine Screen, Urine 12/31/2023 Negative  Negative Final    Benzodiazepine Screen, Urine 12/31/2023 Negative  Negative Final    Tricyclic Antidepressants Screen 12/31/2023 Negative  Negative Final    Methadone Screen, Urine 12/31/2023 Negative  Negative Final    Barbiturates Screen, Urine 12/31/2023 Negative  Negative Final    Oxycodone Screen, Urine 12/31/2023 Negative  Negative Final    Buprenorphine, Screen, Urine 12/31/2023 Negative  Negative Final    Magnesium 12/31/2023 2.0  1.6 - 2.6 mg/dL Final    WBC 12/31/2023 10.70  3.40 - 10.80 10*3/mm3 Final    RBC 12/31/2023 4.92  3.77 - 5.28 10*6/mm3 Final    Hemoglobin 12/31/2023 12.0  12.0 - 15.9 g/dL Final    Hematocrit 12/31/2023 38.7  34.0 - 46.6 % Final    MCV 12/31/2023 78.7 (L)  79.0 - 97.0 fL Final    MCH 12/31/2023 24.4 (L)  26.6 - 33.0 pg Final    MCHC 12/31/2023 31.0 (L)  31.5 - 35.7 g/dL Final    RDW 12/31/2023 16.3 (H)  12.3 - 15.4 % Final    RDW-SD 12/31/2023 46.5  37.0 - 54.0 fl Final    MPV 12/31/2023 10.8  6.0 - 12.0 fL Final    Platelets 12/31/2023 230  140 - 450 10*3/mm3 Final    Neutrophil % 12/31/2023 67.4  42.7 - 76.0 % Final    Lymphocyte % 12/31/2023 25.0  19.6 - 45.3 % Final    Monocyte % 12/31/2023 6.4  5.0 - 12.0 % Final    Eosinophil % 12/31/2023 0.4  0.3 - 6.2 % Final    Basophil % 12/31/2023 0.5  0.0 - 1.5  % Final    Immature Grans % 12/31/2023 0.3  0.0 - 0.5 % Final    Neutrophils, Absolute 12/31/2023 7.23 (H)  1.70 - 7.00 10*3/mm3 Final    Lymphocytes, Absolute 12/31/2023 2.67  0.70 - 3.10 10*3/mm3 Final    Monocytes, Absolute 12/31/2023 0.68  0.10 - 0.90 10*3/mm3 Final    Eosinophils, Absolute 12/31/2023 0.04  0.00 - 0.40 10*3/mm3 Final    Basophils, Absolute 12/31/2023 0.05  0.00 - 0.20 10*3/mm3 Final    Immature Grans, Absolute 12/31/2023 0.03  0.00 - 0.05 10*3/mm3 Final    nRBC 12/31/2023 0.0  0.0 - 0.2 /100 WBC Final    Extra Tube 12/31/2023 Hold for add-ons.   Final    Auto resulted.    Extra Tube 12/31/2023 hold for add-on   Final    Auto resulted    Extra Tube 12/31/2023 Hold for add-ons.   Final    Auto resulted.    Extra Tube 12/31/2023 Hold for add-ons.   Final    Auto resulted    Fentanyl, Urine 12/31/2023 Negative  Negative Final    RBC, UA 12/31/2023 3-5 (A)  None Seen, 0-2 /HPF Final    WBC, UA 12/31/2023 0-2  None Seen, 0-2 /HPF Final    Bacteria, UA 12/31/2023 Trace (A)  None Seen /HPF Final    Squamous Epithelial Cells, UA 12/31/2023 0-2  None Seen, 0-2 /HPF Final    Hyaline Casts, UA 12/31/2023 None Seen  None Seen /LPF Final    Methodology 12/31/2023 Automated Microscopy   Final        Condition on Discharge:  improved    Vital Signs  Temp:  [96.8 °F (36 °C)] 96.8 °F (36 °C)  Heart Rate:  [109] 109  Resp:  [18] 18  BP: (132)/(72) 132/72      Discharge Disposition:  Rehab Facility or Unit (DC - External)    Discharge Medications:     Discharge Medications        New Medications        Instructions Start Date   prazosin 1 MG capsule  Commonly known as: MINIPRESS   1 mg, Oral, Nightly      risperiDONE 0.5 MG tablet  Commonly known as: risperDAL   0.5 mg, Oral, Every 12 Hours Scheduled               Discharge Diet: Regular     Activity at Discharge: As tolerated     Follow-up Appointments  [facility] Son      Time: I spent  < 30 min  minutes on this discharge activity which included: face-to-face  encounter with the patient, reviewing the data in the system, coordination of the care with the nursing staff as well as consultants, documentation, and entering orders.        Clinician:   Joy Smith MD  01/03/24  11:01 EST

## 2024-01-04 NOTE — PAYOR COMM NOTE
"Gordon Owusu (34 y.o. Female)       Date of Birth   1990    Social Security Number       Address   [address] [place] [id number]    Home Phone   [phone]    MRN   [id number]       Jainism   Anabaptist    Marital Status                               Admission Date   23    Admission Type   Emergency    Admitting Provider   Edin Crocker MD    Attending Provider       Department, Room/Bed   [facility] ADULT PSYCHIATRIC, 1019/2S       Discharge Date   1/3/2024    Discharge Disposition   Rehab Facility or Unit (DC - External)    Discharge Destination                                 Attending Provider: (none)   Allergies: No Known Allergies    Isolation: None   Infection: None   Code Status: Prior    Ht: 170.2 cm (67\")   Wt: 69 kg (152 lb 3.2 oz)    Admission Cmt: None   Principal Problem: Bipolar disorder, unspecified [F31.9]                   Active Insurance as of 2023       Primary Coverage       Payor Plan Insurance Group Employer/Plan Group    [facility] BY [employer] [id number]       Payor Plan Address Payor Plan Phone Number Payor Plan Fax Number Effective Dates    [address] [address]   2021 - None Entered    [facility] [phone]         Subscriber Name Subscriber Birth Date Member ID       GORDON OWUSU 1990 [id number]                     Emergency Contacts        (Rel.) Home Phone Work Phone Mobile Phone    kan [place] (Significant Other) -- -- [phone]                 Discharge Summary        Joy Smith MD at 24 1101          :  1990  MRN:  [id number]  Visit Number:  [id number]      Date of Admission:2023   Date of Discharge:  1/3/2024    Discharge Diagnosis:  Principal Problem:    Bipolar disorder, unspecified  Active Problems:    Polysubstance use disorder    PTSD (post-traumatic stress disorder)        Admission Diagnosis:  Psychosis [F29]     HPI  Gordon Owusu is a " "33 y.o. female who was admitted on 12/31/2023 with complaints of SI and bizarre behaviors and was reportedly responding to internal stimuli.    For details please see H&P dated 1/1/24.     Hospital Course  Patient is a 33 y.o. female presented with psychosis and bizarre behaviors. The patient was admitted to the [facility] adult psych unit for safety, further evaluation and treatment.  The patient presented with symptoms of mixed phillip and she also reported a history of polysubstance use including alcohol, methamphetamine, thc, benzo and opioids.   The patient was started on risperidone for her bipolar disorder. She was monitored for any withdrawals but she didn't exhibit any.   The patient was also able to take part in individual and group counseling sessions and work on appropriate coping skills.  The patient made steady improvement in her mood and expressed feeling more positive and hopeful about future. Sleep and appetite were improved.  The day of discharge the patient was calm, cooperative and pleasant. Mood was reported to be good, and denied SI/HI/AVH. Also reported no medication side effects.  .      Mental Status Exam upon discharge:   Mood \"good\"   Affect-congruent, appropriate, stable  Thought Content-goal directed, no delusional material present  Thought process-linear, organized.  Suicidality: No SI  Homicidality: No HI  Perception: No AH/VH    Procedures Performed         Consults:   Consults       No orders found from 12/2/2023 to 1/1/2024.            Pertinent Test Results:   Admission on 12/31/2023   Component Date Value Ref Range Status    Hepatitis B Surface Ag 12/31/2023 Non-Reactive  Non-Reactive Final    Hep A IgM 12/31/2023 Non-Reactive  Non-Reactive Final    Hep B C IgM 12/31/2023 Non-Reactive  Non-Reactive Final    Hepatitis C Ab 12/31/2023 Reactive (A)  Non-Reactive Final    QT Interval 12/31/2023 362  ms Final    QTC Interval 12/31/2023 417  ms Final    QT Interval 01/02/2024 330  ms " Final    QTC Interval 01/02/2024 414  ms Final    HS Troponin T 01/02/2024 <6  <14 ng/L Final   Admission on 12/31/2023, Discharged on 12/31/2023   Component Date Value Ref Range Status    Glucose 12/31/2023 79  65 - 99 mg/dL Final    BUN 12/31/2023 10  6 - 20 mg/dL Final    Creatinine 12/31/2023 0.83  0.57 - 1.00 mg/dL Final    Sodium 12/31/2023 141  136 - 145 mmol/L Final    Potassium 12/31/2023 3.9  3.5 - 5.2 mmol/L Final    Chloride 12/31/2023 103  98 - 107 mmol/L Final    CO2 12/31/2023 22.4  22.0 - 29.0 mmol/L Final    Calcium 12/31/2023 10.0  8.6 - 10.5 mg/dL Final    Total Protein 12/31/2023 8.0  6.0 - 8.5 g/dL Final    Albumin 12/31/2023 4.6  3.5 - 5.2 g/dL Final    ALT (SGPT) 12/31/2023 20  1 - 33 U/L Final    AST (SGOT) 12/31/2023 15  1 - 32 U/L Final    Alkaline Phosphatase 12/31/2023 76  39 - 117 U/L Final    Total Bilirubin 12/31/2023 0.3  0.0 - 1.2 mg/dL Final    Globulin 12/31/2023 3.4  gm/dL Final    A/G Ratio 12/31/2023 1.4  g/dL Final    BUN/Creatinine Ratio 12/31/2023 12.0  7.0 - 25.0 Final    Anion Gap 12/31/2023 15.6 (H)  5.0 - 15.0 mmol/L Final    eGFR 12/31/2023 94.4  >60.0 mL/min/1.73 Final    HCG, Urine QL 12/31/2023 Negative  Negative Final    Color, UA 12/31/2023 Yellow  Yellow, Straw Final    Appearance, UA 12/31/2023 Clear  Clear Final    pH, UA 12/31/2023 6.5  5.0 - 8.0 Final    Specific [place], UA 12/31/2023 1.019  1.005 - 1.030 Final    Glucose, UA 12/31/2023 Negative  Negative Final    Ketones, UA 12/31/2023 Negative  Negative Final    Bilirubin, UA 12/31/2023 Negative  Negative Final    Blood, UA 12/31/2023 Small (1+) (A)  Negative Final    Protein, UA 12/31/2023 Negative  Negative Final    Leuk Esterase, UA 12/31/2023 Small (1+) (A)  Negative Final    Nitrite, UA 12/31/2023 Negative  Negative Final    Urobilinogen, UA 12/31/2023 0.2 E.U./dL  0.2 - 1.0 E.U./dL Final    Ethanol 12/31/2023 <10  0 - 10 mg/dL Final    Ethanol % 12/31/2023 <0.010  % Final    THC, Screen, Urine  12/31/2023 Negative  Negative Final    Phencyclidine (PCP), Urine 12/31/2023 Negative  Negative Final    Cocaine Screen, Urine 12/31/2023 Negative  Negative Final    Methamphetamine, Ur 12/31/2023 Negative  Negative Final    Opiate Screen 12/31/2023 Negative  Negative Final    Amphetamine Screen, Urine 12/31/2023 Negative  Negative Final    Benzodiazepine Screen, Urine 12/31/2023 Negative  Negative Final    Tricyclic Antidepressants Screen 12/31/2023 Negative  Negative Final    Methadone Screen, Urine 12/31/2023 Negative  Negative Final    Barbiturates Screen, Urine 12/31/2023 Negative  Negative Final    Oxycodone Screen, Urine 12/31/2023 Negative  Negative Final    Buprenorphine, Screen, Urine 12/31/2023 Negative  Negative Final    Magnesium 12/31/2023 2.0  1.6 - 2.6 mg/dL Final    WBC 12/31/2023 10.70  3.40 - 10.80 10*3/mm3 Final    RBC 12/31/2023 4.92  3.77 - 5.28 10*6/mm3 Final    Hemoglobin 12/31/2023 12.0  12.0 - 15.9 g/dL Final    Hematocrit 12/31/2023 38.7  34.0 - 46.6 % Final    MCV 12/31/2023 78.7 (L)  79.0 - 97.0 fL Final    MCH 12/31/2023 24.4 (L)  26.6 - 33.0 pg Final    MCHC 12/31/2023 31.0 (L)  31.5 - 35.7 g/dL Final    RDW 12/31/2023 16.3 (H)  12.3 - 15.4 % Final    RDW-SD 12/31/2023 46.5  37.0 - 54.0 fl Final    MPV 12/31/2023 10.8  6.0 - 12.0 fL Final    Platelets 12/31/2023 230  140 - 450 10*3/mm3 Final    Neutrophil % 12/31/2023 67.4  42.7 - 76.0 % Final    Lymphocyte % 12/31/2023 25.0  19.6 - 45.3 % Final    Monocyte % 12/31/2023 6.4  5.0 - 12.0 % Final    Eosinophil % 12/31/2023 0.4  0.3 - 6.2 % Final    Basophil % 12/31/2023 0.5  0.0 - 1.5 % Final    Immature Grans % 12/31/2023 0.3  0.0 - 0.5 % Final    Neutrophils, Absolute 12/31/2023 7.23 (H)  1.70 - 7.00 10*3/mm3 Final    Lymphocytes, Absolute 12/31/2023 2.67  0.70 - 3.10 10*3/mm3 Final    Monocytes, Absolute 12/31/2023 0.68  0.10 - 0.90 10*3/mm3 Final    Eosinophils, Absolute 12/31/2023 0.04  0.00 - 0.40 10*3/mm3 Final    Basophils,  Absolute 12/31/2023 0.05  0.00 - 0.20 10*3/mm3 Final    Immature Grans, Absolute 12/31/2023 0.03  0.00 - 0.05 10*3/mm3 Final    nRBC 12/31/2023 0.0  0.0 - 0.2 /100 WBC Final    Extra Tube 12/31/2023 Hold for add-ons.   Final    Auto resulted.    Extra Tube 12/31/2023 hold for add-on   Final    Auto resulted    Extra Tube 12/31/2023 Hold for add-ons.   Final    Auto resulted.    Extra Tube 12/31/2023 Hold for add-ons.   Final    Auto resulted    Fentanyl, Urine 12/31/2023 Negative  Negative Final    RBC, UA 12/31/2023 3-5 (A)  None Seen, 0-2 /HPF Final    WBC, UA 12/31/2023 0-2  None Seen, 0-2 /HPF Final    Bacteria, UA 12/31/2023 Trace (A)  None Seen /HPF Final    Squamous Epithelial Cells, UA 12/31/2023 0-2  None Seen, 0-2 /HPF Final    Hyaline Casts, UA 12/31/2023 None Seen  None Seen /LPF Final    Methodology 12/31/2023 Automated Microscopy   Final        Condition on Discharge:  improved    Vital Signs  Temp:  [96.8 °F (36 °C)] 96.8 °F (36 °C)  Heart Rate:  [109] 109  Resp:  [18] 18  BP: (132)/(72) 132/72      Discharge Disposition: Admit [facility]   Rehab Facility or Unit (DC - External)    Discharge Medications:     Discharge Medications        New Medications        Instructions Start Date   prazosin 1 MG capsule  Commonly known as: MINIPRESS   1 mg, Oral, Nightly      risperiDONE 0.5 MG tablet  Commonly known as: risperDAL   0.5 mg, Oral, Every 12 Hours Scheduled               Discharge Diet: Regular     Activity at Discharge: As tolerated     Follow-up Appointments  [facility]      Time: I spent  < 30 min  minutes on this discharge activity which included: face-to-face encounter with the patient, reviewing the data in the system, coordination of the care with the nursing staff as well as consultants, documentation, and entering orders.        Clinician:   Joy Smith MD  01/03/24  11:01 EST    Electronically signed by Joy Smith MD at 01/03/24 1104